# Patient Record
Sex: FEMALE | Race: WHITE | Employment: UNEMPLOYED | URBAN - METROPOLITAN AREA
[De-identification: names, ages, dates, MRNs, and addresses within clinical notes are randomized per-mention and may not be internally consistent; named-entity substitution may affect disease eponyms.]

---

## 2018-06-15 LAB
CREAT SERPL-MCNC: 0.56 MG/DL (ref 0.5–1)
GFR SERPL CREATININE-BSD FRML MDRD: 101 ML/MIN/1.73M2
GLUCOSE SERPL-MCNC: 126 MG/DL (ref 70–100)
POTASSIUM SERPL-SCNC: 4.4 MEQ/L (ref 3.5–5.3)

## 2018-06-26 ENCOUNTER — TRANSFERRED RECORDS (OUTPATIENT)
Dept: HEALTH INFORMATION MANAGEMENT | Facility: CLINIC | Age: 83
End: 2018-06-26

## 2018-07-31 ENCOUNTER — TRANSFERRED RECORDS (OUTPATIENT)
Dept: HEALTH INFORMATION MANAGEMENT | Facility: CLINIC | Age: 83
End: 2018-07-31

## 2018-08-10 ENCOUNTER — TELEPHONE (OUTPATIENT)
Dept: GASTROENTEROLOGY | Facility: CLINIC | Age: 83
End: 2018-08-10

## 2018-08-10 NOTE — TELEPHONE ENCOUNTER
Call to patient's daughter per message below.  Patient scheduled to see Dr. He on 18.  Per daughter, patient is a resident at [facility] in [place] which is attached to [facility].  Some records might be at this location, but most pertinent information should be in the [facility] system.  This writer will fax a request to HIMs at [facility] to send us records.        [facility] can you get this patient scheduled? OK per Dr. He.  Susana Gautam.  3/29/1927 and  her phone number is [phone]  Anushka who attends visits with her and who gave permission to include her contact  info: [phone] cell.    Antoinette Chris  RN, BSN, PHN  GI, Hepatology and Gen Surg Care Coordinator

## 2018-08-13 NOTE — TELEPHONE ENCOUNTER
Fax sent to [facility] HIMs department to request records.   Carol Westholter

## 2018-08-13 NOTE — TELEPHONE ENCOUNTER
Notes rec'd from [facility] but no labs, etc.  Fax sent back to request these.      Carol Westholter

## 2018-09-05 ENCOUNTER — OFFICE VISIT (OUTPATIENT)
Dept: GASTROENTEROLOGY | Facility: CLINIC | Age: 83
End: 2018-09-05
Payer: MEDICARE

## 2018-09-05 VITALS
WEIGHT: 93 LBS | DIASTOLIC BLOOD PRESSURE: 72 MMHG | OXYGEN SATURATION: 92 % | SYSTOLIC BLOOD PRESSURE: 135 MMHG | HEIGHT: 60 IN | BODY MASS INDEX: 18.26 KG/M2 | HEART RATE: 86 BPM

## 2018-09-05 DIAGNOSIS — A04.71 RECURRENT CLOSTRIDIUM DIFFICILE DIARRHEA: Primary | ICD-10-CM

## 2018-09-05 PROCEDURE — 99205 OFFICE O/P NEW HI 60 MIN: CPT | Performed by: INTERNAL MEDICINE

## 2018-09-05 RX ORDER — VITAMIN B COMPLEX
TABLET ORAL
COMMUNITY
Start: 2018-02-27

## 2018-09-05 RX ORDER — CHLORAL HYDRATE 500 MG
1 CAPSULE ORAL
COMMUNITY
Start: 2012-12-15

## 2018-09-05 RX ORDER — CALCIUM CARBONATE 500(1250)
TABLET ORAL
COMMUNITY

## 2018-09-05 RX ORDER — METOPROLOL SUCCINATE 50 MG/1
50 TABLET, EXTENDED RELEASE ORAL
COMMUNITY
Start: 2018-06-20

## 2018-09-05 RX ORDER — MULTIVITAMIN,THER AND MINERALS
TABLET ORAL
COMMUNITY
Start: 2012-12-15

## 2018-09-05 RX ORDER — FUROSEMIDE 20 MG
20 TABLET ORAL
COMMUNITY
Start: 2018-06-01

## 2018-09-05 RX ORDER — ESCITALOPRAM OXALATE 10 MG/1
10 TABLET ORAL
COMMUNITY
Start: 2018-02-27

## 2018-09-05 RX ORDER — VANCOMYCIN HYDROCHLORIDE 125 MG/1
CAPSULE ORAL
COMMUNITY
Start: 2018-08-23

## 2018-09-05 ASSESSMENT — PAIN SCALES - GENERAL: PAINLEVEL: NO PAIN (0)

## 2018-09-05 NOTE — MR AVS SNAPSHOT
After Visit Summary   9/5/2018    Susana Gautam    MRN: [id number]           Patient Information     Date Of Birth          3/29/1927        Visit Information        Provider Department      9/5/2018 3:00 PM Brant He MD [facility]         Follow-ups after your visit        Your next 10 appointments already scheduled     Nov 07, 2018  2:00 PM CST   Return Visit with Brant He MD   [facility]    [id number] [place] [id number] [phone]              Who to contact     If you have questions or need follow up information about today's clinic visit or your schedule please contact [facility] directly at [phone].  Normal or non-critical lab and imaging results will be communicated to you by MyChart, letter or phone within 4 business days after the clinic has received the results. If you do not hear from us within 7 days, please contact the clinic through MyChart or phone. If you have a critical or abnormal lab result, we will notify you by phone as soon as possible.  Submit refill requests through [employer] or call your pharmacy and they will forward the refill request to us. Please allow 3 business days for your refill to be completed.          Additional Information About Your Visit        Shield Therapeuticshart Information     [employer] gives you secure access to your electronic health record. If you see a primary care provider, you can also send messages to your care team and make appointments. If you have questions, please call your primary care clinic.  If you do not have a primary care provider, please call [phone] and they will assist you.      [employer] is an electronic gateway that provides easy, online access to your medical records. With [employer], you can request a clinic appointment, read your test results, renew a prescription or communicate with your care team.     To access your  existing account, please contact your [facility] or call [phone] for assistance.        Care EveryWhere ID     This is your Care EveryWhere ID. This could be used by other organizations to access your [place] medical records  [id number]        Your Vitals Were     Pulse Height Pulse Oximetry BMI (Body Mass Index)          86 1.524 m (5') 92% 18.16 kg/m2         Blood Pressure from Last 3 Encounters:   09/05/18 135/72   02/04/14 193/82   11/30/11 132/70    Weight from Last 3 Encounters:   09/05/18 42.2 kg (93 lb)   11/30/11 46.7 kg (103 lb)   05/04/11 47.7 kg (105 lb 3.2 oz)              Today, you had the following     No orders found for display       Primary Care Provider Office Phone # Fax #    Palma Owens [phone]       [facility] [address] [place] [address]        Equal Access to Services     ASHIA WONG : Hadii aad ku hadasho Soomaali, waaxda luqadaha, qaybta kaalmada adeegyada, waxay idiin hayaan adeeg kharachula la'wiltonn . So [facility] [phone].    ATENCIÓN: Si habla español, tiene a zee disposición servicios gratuitos de asistencia lingüística. Joshua al [phone].    We comply with applicable federal civil rights laws and [place] laws. We do not discriminate on the basis of race, color, national origin, age, disability, sex, sexual orientation, or gender identity.            Thank you!     Thank you for choosing [facility]  for your care. Our goal is always to provide you with excellent care. Hearing back from our patients is one way we can continue to improve our services. Please take a few minutes to complete the written survey that you may receive in the mail after your visit with us. Thank you!             Your Updated Medication List - Protect others around you: Learn how to safely use, store and throw away your medicines at www.disposemymeds.org.          This list is accurate as of 9/5/18  4:20 PM.   Always use your most recent med list.                   Brand Name Dispense Instructions for use Diagnosis    apixaban ANTICOAGULANT 2.5 MG tablet    ELIQUIS     2 times daily.        Calcium Oyster Shell 1250 (500 Ca) MG Tabs      Take 1 Tab by mouth twice daily.  1250 mg = 500 mg elemental calcium        escitalopram 10 MG tablet    LEXAPRO     Take 10 mg by mouth        fish oil-omega-3 fatty acids 1000 MG capsule      Take 1 g by mouth        furosemide 20 MG tablet    LASIX     Take 20 mg by mouth        metoprolol succinate 50 MG 24 hr tablet    TOPROL-XL     Take 50 mg by mouth        vancomycin 125 MG capsule    VANCOCIN     Take daily until you see Dr. He        vitamin  s/Minerals Tabs           vitamin D3 1000 units Caps           vitamin E 100 UNIT capsule    TOCOPHEROL     Take 400 Units by mouth        Vitamin-B Complex Tabs

## 2018-09-06 NOTE — PROGRESS NOTES
Visit Date:   09/05/2018      REFERRING PHYSICIAN: Palma Owens MD with [facility]      SUBJECTIVE: The patient is a 89-year-old woman with recurrent C. difficile infection. The question is evaluation and treatment of recurrent C. difficile infection.  The patient moved out of her house into a [facility] community in 2017.  She had a difficult 2018 year, starting with a flu early on that developed into pneumonia, then she had 5 hospitalizations with pneumonia, atrial fibrillation and congestive heart failure being the main reasons.  After treatment of her pneumonia, sometime in March she developed diarrhea that was diagnosed as C. difficile infection.  The diarrhea was fairly violent and accompanied by multiple episodes of soiling and incontinence.  The treatment included vancomycin.  She is on her third round and the first taper of that.      PAST MEDICAL HISTORY:  Notable for congestive heart failure, atrial fibrillation, osteoporosis.  She is status post hysterectomy in her 30s.      MEDICATIONS: Do not include a PPI or statins.  She is on an anticoagulant.  She also has underlying anxiety and takes Lexapro.      PHYSICAL EXAMINATION: She is absolutely delightful, good energy, no acute distress. Vitals stable.  The patient is concerned about some weight loss that has occurred over this past year, fatigue, as well as some loss of appetite.  Her bowel movements at this time are somewhat increased in frequency relative to her normal, but typical for somebody on vancomycin.  She reports 3 soft, [place] scale type 5 bowel movements per day.  Her normal is 1 type 4 per day.      ASSESSMENT AND PLAN:  I went over in detail all the options for treatment of C. difficile infection, covering Dificid, Zinplava, fecal microbiota transplant, role of probiotics.  In the end, we decided that the risk of recurrence of C. difficile was lowest with fecal microbiota transplant and we will proceed with the treatment which  will be composed of 1 dose containing 5 capsules of encapsulated microbiota.  The treatment is anticipated to be done within a week.  She will be off vancomycin for 2 days prior to it.  I will see her in followup about 2 months after she takes it.  We discussed the typical expected course and I provided her additional literature on the treatment.      Total time spent in face-to-face consultation exceeded 1 hour.  Over 50% was spent in counseling and coordinating care.         DANIAL JACOB MD             D: 2018   T: 2018   MT:       Name:     ALEX REESEN   MRN:      [id number]        Account:      [id number]   :      1927           Visit Date:   2018      Document: [id number]

## 2018-11-07 ENCOUNTER — OFFICE VISIT (OUTPATIENT)
Dept: GASTROENTEROLOGY | Facility: CLINIC | Age: 83
End: 2018-11-07
Payer: MEDICARE

## 2018-11-07 VITALS
HEIGHT: 60 IN | DIASTOLIC BLOOD PRESSURE: 82 MMHG | WEIGHT: 94.9 LBS | BODY MASS INDEX: 18.63 KG/M2 | SYSTOLIC BLOOD PRESSURE: 132 MMHG | OXYGEN SATURATION: 93 % | HEART RATE: 108 BPM

## 2018-11-07 DIAGNOSIS — L03.818 CELLULITIS OF OTHER SPECIFIED SITE: Primary | ICD-10-CM

## 2018-11-07 DIAGNOSIS — A04.71 RECURRENT CLOSTRIDIUM DIFFICILE DIARRHEA: ICD-10-CM

## 2018-11-07 PROCEDURE — 99215 OFFICE O/P EST HI 40 MIN: CPT | Performed by: INTERNAL MEDICINE

## 2018-11-07 RX ORDER — SULFAMETHOXAZOLE AND TRIMETHOPRIM 400; 80 MG/1; MG/1
1 TABLET ORAL 2 TIMES DAILY
COMMUNITY

## 2018-11-07 ASSESSMENT — PAIN SCALES - GENERAL: PAINLEVEL: NO PAIN (0)

## 2018-11-07 NOTE — MR AVS SNAPSHOT
After Visit Summary   11/7/2018    Susana Gautam    MRN: [id number]           Patient Information     Date Of Birth          3/29/1927        Visit Information        Provider Department      11/7/2018 2:00 PM Brant He MD [facility]        Today's Diagnoses     Cellulitis of other specified site    -  1    Recurrent Clostridium difficile diarrhea           Follow-ups after your visit        Who to contact     If you have questions or need follow up information about today's clinic visit or your schedule please contact [facility] directly at [phone].  Normal or non-critical lab and imaging results will be communicated to you by SimpleReachhart, letter or phone within 4 business days after the clinic has received the results. If you do not hear from us within 7 days, please contact the clinic through SimpleReachhart or phone. If you have a critical or abnormal lab result, we will notify you by phone as soon as possible.  Submit refill requests through [employer] or call your pharmacy and they will forward the refill request to us. Please allow 3 business days for your refill to be completed.          Additional Information About Your Visit        MyChart Information     [employer] gives you secure access to your electronic health record. If you see a primary care provider, you can also send messages to your care team and make appointments. If you have questions, please call your primary care clinic.  If you do not have a primary care provider, please call [phone] and they will assist you.      [employer] is an electronic gateway that provides easy, online access to your medical records. With [employer], you can request a clinic appointment, read your test results, renew a prescription or communicate with your care team.     To access your existing account, please contact your [facility] or call [phone] for assistance.        Care  EveryWhere ID     This is your Care EveryWhere ID. This could be used by other organizations to access your [place] medical records  [id number]        Your Vitals Were     Pulse Height Pulse Oximetry BMI (Body Mass Index)          108 1.524 m (5') 93% 18.53 kg/m2         Blood Pressure from Last 3 Encounters:   11/07/18 132/82   09/05/18 135/72   02/04/14 193/82    Weight from Last 3 Encounters:   11/07/18 43 kg (94 lb 14.4 oz)   09/05/18 42.2 kg (93 lb)   11/30/11 46.7 kg (103 lb)              Today, you had the following     No orders found for display       Primary Care Provider Office Phone # Fax #    Palma BANUELOS Roman [phone]       [facility] [address]  [facility] [address]        Equal Access to Services     [facility]: Hadii aad ku hadasho Soomaali, waaxda luqadaha, qaybta kaalmada adeegyada, waxay idiin hayaan faisal [place] . So [facility] [phone].    ATENCIÓN: Si habla español, tiene a zee disposición servicios gratuitos de asistencia lingüística. Joshua al [phone].    We comply with applicable federal civil rights laws and [place] laws. We do not discriminate on the basis of race, color, national origin, age, disability, sex, sexual orientation, or gender identity.            Thank you!     Thank you for choosing [facility]  for your care. Our goal is always to provide you with excellent care. Hearing back from our patients is one way we can continue to improve our services. Please take a few minutes to complete the written survey that you may receive in the mail after your visit with us. Thank you!             Your Updated Medication List - Protect others around you: Learn how to safely use, store and throw away your medicines at www.disposemymeds.org.          This list is accurate as of 11/7/18 11:59 PM.  Always use your most recent med list.                   Brand Name Dispense Instructions for use Diagnosis    apixaban ANTICOAGULANT  2.5 MG tablet    ELIQUIS     2 times daily.        Calcium Oyster Shell 1250 (500 Ca) MG Tabs      Take 1 Tab by mouth twice daily.  1250 mg = 500 mg elemental calcium        escitalopram 10 MG tablet    LEXAPRO     Take 10 mg by mouth        fish oil-omega-3 fatty acids 1000 MG capsule      Take 1 g by mouth        furosemide 20 MG tablet    LASIX     Take 20 mg by mouth        metoprolol succinate 50 MG 24 hr tablet    TOPROL-XL     Take 50 mg by mouth        sulfamethoxazole-trimethoprim 400-80 MG per tablet    BACTRIM/SEPTRA     Take 1 tablet by mouth 2 times daily        vancomycin 125 MG capsule    VANCOCIN     Take daily until you see Dr. He        vitamin  s/Minerals Tabs           vitamin D3 1000 units Caps           vitamin E 100 UNIT capsule    TOCOPHEROL     Take 400 Units by mouth        Vitamin-B Complex Tabs

## 2018-11-08 ENCOUNTER — TELEPHONE (OUTPATIENT)
Dept: GASTROENTEROLOGY | Facility: CLINIC | Age: 83
End: 2018-11-08

## 2018-11-08 NOTE — TELEPHONE ENCOUNTER
I called the patient to explain again that she will take vancomycin once daily for as long as she is on Bactrim (or whatever antibiotic).  She will get another round of cFMT after that is completed.    Current plan is that she continue Bactrim for 2 weeks.

## 2018-11-08 NOTE — PROGRESS NOTES
Visit Date:   2018      The patient is a 89-year-old woman with history of multiply recurrent C. difficile infection.  She was treated with oral FMT using capsules about 2 months ago, which was successful.  She has normalized her bowel movements.  Unfortunately, she has bumped her leg and saw a dermatologist yesterday.  The preliminary results are that she has some staph infection there.  They started sulfamethoxazole and waiting for cultures at this time.  The redness around the wound has stabilized, although increased since Monday.        On examination, there is a large area of erythema going to about mid calf.  There is mild warmth.  The leg is not hot.  The patient is not systemically ill.  She has no fever and is otherwise feeling well.        Given that there is no toxicity, I think it is safe to wait until cultures are ready for further antibiotic guidance.  However, with respect to C. difficile risk, she has about a 25% chance of relapse with this antibiotic treatment.  Even if it is sulfa, the risk is nontrivial.  The patient is discouraged somewhat from all these medical troubles and does not wish face another C. difficile episode.  Therefore, the safest course of action is to continue on the prophylactic vancomycin at this time or reinstitute the prophylactic vancomycin 125 mg once daily for as long as she is on whatever antibiotic treatment is felt to be optimal for the wound care.  Once she is done with that we will simply retreat with capsule FMT.      Total time spent in face-to-face consultation was 40 minutes.  Over 50% was spent in counseling and coordinating care.         DANIAL JACOB MD             D: 2018   T: 2018   MT: QUE      Name:     ISABELLE REESE   MRN:      [id number]        Account:      [id number]   :      1927           Visit Date:   2018      Document: [id number]

## 2018-11-08 NOTE — TELEPHONE ENCOUNTER
This writer contact patient to let them know that Dr He has been paged to find out how long and how much vancomycin is supposed to be taken for. Patient would be contacted once hearing back from Dr He. Patient agreed to plan     Parth ELI

## 2018-11-08 NOTE — TELEPHONE ENCOUNTER
Spoke with Dr He and he stated that he will contact patient to discuss. Informed patient and they verbalized understanding     Parth ELI

## 2018-11-08 NOTE — TELEPHONE ENCOUNTER
Health Call Center    Phone Message    May a detailed message be left on voicemail: yes    Reason for Call: Medication Question or concern regarding medication   Prescription Clarification  Name of Medication: vancomycin (VANCOCIN) 125 MG capsule [24930] (Order [id number])    Prescribing Provider: Dr. He   Pharmacy: [facility]    What on the order needs clarification? Patient saw Dr. He yesterday and was advised to start taking this medication again but can not recall how much and how long. Please advise.           Action Taken: Message routed to:  Adult Clinics: Gastroenterology (GI) p [id number]

## 2018-12-07 ENCOUNTER — TELEPHONE (OUTPATIENT)
Dept: GASTROENTEROLOGY | Facility: CLINIC | Age: 83
End: 2018-12-07

## 2018-12-07 NOTE — TELEPHONE ENCOUNTER
Attempted to reach daughter Saadia, but no answer. Left generic message stating that we received the message and that the team will follow up with her on Monday. Informed Saadia via voicemail that if there are any outside records, these could be faxed to [phone].    Hamida Garza RN, BSN

## 2018-12-07 NOTE — TELEPHONE ENCOUNTER
[facility] Call Center    Phone Message  patients daughter called - mom has C-Diff again would like the doctors team to know - they are bringing her in to the hospital as her fever is high. call either cell phone numbers please    May a detailed message be left on voicemail: yes    Reason for Call: Other: patients daughter called - mom has C-Diff again would like the doctors team to know - they are bringing her in to the hospital as her fever is high. call either cell phone numbers please     Action Taken: Message routed to:  Adult Clinics: Gastroenterology (GI) p [id number]

## 2018-12-10 NOTE — TELEPHONE ENCOUNTER
Nurse called patient this morning and left message to return call.    Antoinette Chris RN, BSN, PHN  [facility]  GI/Gen Surg/Hepatology Care Coordinator

## 2018-12-11 NOTE — TELEPHONE ENCOUNTER
Nurse called and left 2 messages on VM to return call to update how she is doing.    Antoinette Chris RN, BSN, PHN  [facility]  GI/Gen Surg/Hepatology Care Coordinator

## 2019-09-28 ENCOUNTER — HEALTH MAINTENANCE LETTER (OUTPATIENT)
Age: 84
End: 2019-09-28

## 2020-03-15 ENCOUNTER — HEALTH MAINTENANCE LETTER (OUTPATIENT)
Age: 85
End: 2020-03-15

## 2021-01-10 ENCOUNTER — HEALTH MAINTENANCE LETTER (OUTPATIENT)
Age: 86
End: 2021-01-10

## 2021-05-08 ENCOUNTER — HEALTH MAINTENANCE LETTER (OUTPATIENT)
Age: 86
End: 2021-05-08

## 2021-10-23 ENCOUNTER — HEALTH MAINTENANCE LETTER (OUTPATIENT)
Age: 86
End: 2021-10-23

## 2022-06-04 ENCOUNTER — HEALTH MAINTENANCE LETTER (OUTPATIENT)
Age: 87
End: 2022-06-04

## 2022-10-10 ENCOUNTER — HEALTH MAINTENANCE LETTER (OUTPATIENT)
Age: 87
End: 2022-10-10

## 2023-01-01 ENCOUNTER — HEALTH MAINTENANCE LETTER (OUTPATIENT)
Age: 88
End: 2023-01-01